# Patient Record
(demographics unavailable — no encounter records)

---

## 2025-02-24 NOTE — PLAN
[FreeTextEntry1] : - Obtain REEG (called to attempt to arrange same day appointment), also discussed aEEG if needed, and lab work including vitamin D and thyroid levels - Recommended psychiatrist and therapists to help with mood and stress management. Written psych, CBT, and biofeedback resources provided. - Maintain proper hydration and nutrition - Monitor for any additional fainting episodes/new symptoms - Compression stockings. - Referral to Cardiology for evaluation of possible cardiac causes of syncope - Provide seizure safety instructions to mother and patient - Will consider sleep study if nightly episodes continue after adjustments to new environment - Follow up in 2 months, or sooner if symptoms worsen.

## 2025-02-24 NOTE — CONSULT LETTER
[Dear  ___] : Dear  [unfilled], [Please see my note below.] : Please see my note below. [Sincerely,] : Sincerely, [FreeTextEntry3] : Melody Villela,  Attending Child Neurologist/Epileptologist

## 2025-02-24 NOTE — QUALITY MEASURES
[Seizure frequency] : Seizure frequency: Yes [Etiology, seizure type, and epilepsy syndrome] : Etiology, seizure type, and epilepsy syndrome: Yes [Side effects of anti-seizure medications] : Side effects of anti-seizure medications: Yes [Safety and education around seizures] : Safety and education around seizures: Yes [Sudden unexpected death in epilepsy (SUDEP)] : Sudden unexpected death in epilepsy: Yes [Screening for anxiety, depression] : Screening for anxiety, depression: Yes [25 Hydroxy Vitamin D level assessed and Vitamin D3 ordered] : 25 Hydroxy Vitamin D level assessed and Vitamin D3 ordered: Yes [Thyroid profile ordered] : Thyroid profile ordered: Yes [Issues around driving] : Issues around driving: Not Applicable [Treatment-resistant epilepsy (every visit)] : Treatment-resistant epilepsy (every visit): Not Applicable [Adherence to medication(s)] : Adherence to medication(s): Not Applicable [Counseling for women of childbearing potential with epilepsy (including folic acid supplement)] : Counseling for women of childbearing potential with epilepsy (including folic acid supplement): Not Applicable [Options for adjunctive therapy (Neurostimulation, CBD, Dietary Therapy, Epilepsy Surgery)] : Options for adjunctive therapy (Neurostimulation, CBD, Dietary Therapy, Epilepsy Surgery): Not Applicable

## 2025-02-24 NOTE — PHYSICAL EXAM
[Well-appearing] : well-appearing [Normocephalic] : normocephalic [No dysmorphic facial features] : no dysmorphic facial features [No ocular abnormalities] : no ocular abnormalities [Lungs clear] : lungs clear [Heart sounds regular in rate and rhythm] : heart sounds regular in rate and rhythm [Soft] : soft [No abnormal neurocutaneous stigmata or skin lesions] : no abnormal neurocutaneous stigmata or skin lesions [No deformities] : no deformities [Alert] : alert [Well related, good eye contact] : well related, good eye contact [Conversant] : conversant [Normal speech and language] : normal speech and language [Follows instructions well] : follows instructions well [Pupils reactive to light and accommodation] : pupils reactive to light and accommodation [Full extraocular movements] : full extraocular movements [Saccadic and smooth pursuits intact] : saccadic and smooth pursuits intact [No nystagmus] : no nystagmus [Normal facial sensation to light touch] : normal facial sensation to light touch [No facial asymmetry or weakness] : no facial asymmetry or weakness [Gross hearing intact] : gross hearing intact [Equal palate elevation] : equal palate elevation [Good shoulder shrug] : good shoulder shrug [Normal tongue movement] : normal tongue movement [Midline tongue, no fasciculations] : midline tongue, no fasciculations [L handed] : L handed [Normal axial and appendicular muscle tone] : normal axial and appendicular muscle tone [Gets up on table without difficulty] : gets up on table without difficulty [No pronator drift] : no pronator drift [Normal finger tapping and fine finger movements] : normal finger tapping and fine finger movements [No abnormal involuntary movements] : no abnormal involuntary movements [5/5 strength in proximal and distal muscles of arms and legs] : 5/5 strength in proximal and distal muscles of arms and legs [Walks and runs well] : walks and runs well [Able to do deep knee bend] : able to do deep knee bend [Able to walk on heels] : able to walk on heels [Able to walk on toes] : able to walk on toes [2+ biceps] : 2+ biceps [Triceps] : triceps [Knee jerks] : knee jerks [Ankle jerks] : ankle jerks [No ankle clonus] : no ankle clonus [Bilaterally] : bilaterally [No dysmetria on FTNT] : no dysmetria on FTNT [Good walking balance] : good walking balance [Normal gait] : normal gait [Able to tandem well] : able to tandem well [Negative Romberg] : negative Romberg [de-identified] : holding a plushie ("Onkie" - a pink pig) [de-identified] : Localized to LT and vibration

## 2025-02-24 NOTE — REVIEW OF SYSTEMS
[Fainting] : fainting [Seizure] : seizures [Anxiety] : anxiety [Normal] : Integumentary [Headache] : no headache [Dizziness] : no dizziness [Depression] : no depression

## 2025-02-24 NOTE — REASON FOR VISIT
[Initial Consultation] : an initial consultation for [Seizure] : seizure [Syncope] : syncope [Medical Records] : medical records [Patient] : patient [Mother] : mother

## 2025-02-24 NOTE — HISTORY OF PRESENT ILLNESS
[FreeTextEntry1] : Lucio is a 8 year old, left-handed (familial) male with dyslexia, ADHD, and speech and fine motor delays who presents to Neurology for their initial visit with concerns of syncope vs seizure. Accompanied by mother today.  Patient reports he was eating chocolate and opening [employer] cards at home when he took a bite of a chocolate kiss when he LOC and fell. Had urinary incontinence but denies tongue biting, foaming at the mouth, pallor, sweating, apnea, cyanosis, choking, post-ictal Juve's, convulsions. Per mother, she found him stiff on the floor with fist clenched eyes closed. Took a few seconds to regain consciousness and return back to baseline. Per mother he had intermittent consciousness (unconsciousness when sat up, regain consciousness when laid down) for total of 12 seconds. This was first time episode denies previous similar episodes. Occurred in the setting of flu. After the episode denies confusion, tiredness, HA, or myalgia. Denies autonomic (eye redness, tearing, sweating), vision changes (scotoma, photopsia, diplopia), positional changes, headaches waking up from sleep, weakness, speech, swallowing difficulties, paresthesias, vertigo, dizziness, lightheadedness, neck stiffness, any aura, triggers, emesis, nausea, photophobia, phonophobia, allergies.  Mother states patient usually sleeps around 8:30-9PM and starting in the past 2 years, has had 5-10 episodes, few minutes long (where patient is amnestic to event), occurring between 12-2 AM, he has episodes of rocking forth and backwards, sometimes with clenched hands, eye closed, sweaty, trance state, shakily, and urinary incontinence at times. She reports they moved from [place] 6/2024 and currently living with grandparents while house was being remodeled. Aunt is left-handed   Has Speech and fine motor delays; not on ADHD medications getting school counseling for anxiety from schoolwork through IEP Denies any previous meningitis, head trauma/concussion, neurosurgical procedure, autism, staring episodes, myoclonus, convulsions, waking up with blood on pillow, unexplained myalgias, febrile seizures, history of previous post-ictal Juve's, status epilepticus or seizure clusters, family history of seizures, autism, or developmental delays. Semiology of Events: stiffening Duration: <12 seconds Current Total Seizure Frequency: 1 lifetime event  Denies current fever, chill, URI symptoms, diarrhea, rash, sick contacts.   Birth History: full-term, denies NICU stays or complications, talking and fine motor was delayed but otherwise met developmental milestones, denies birthmarks Developmental History: has Speech therapy for articulation and drooling, has OT for fine motor skills, denies PT or NATALIIA therapy Past Medical History: dyslexia, ADHD, fine motor and speech delays Past Surgical History: denies Medications: were on albuterol and budesonide until 5 year old for croup and wheezing (ruled out asthma per mother) Allergies: denies Social History: Lives at home with parent and sister - healthy School: Currently in 3rd grade, doing well, has IEP for dyslexia and ADHD; loves video games; denies depression; anxious from schoolwork - school counseling   Family History: Denies family history of seizures, developmental delays, autism, headaches, or other neurological disorders.  Per chart review, seen in ED on 2/14/25 for syncope. 8-year-old male no past medical history presents ED brought in by EMS for syncopal episode at home. Unwitnessed, felt members heard a thud 1 hour prior to arrival patient was in dining area and members were in kitchen area heard a thud, for members walked over found patient lying on ground, neck extended arms clenched lasted for about 12 seconds, then had few seconds of confused speech still had another episode for 4 to 5 seconds. Subsequently returned to baseline.  No postictal phase.  Patient was diagnosed with flu on Tuesday, was given Tamiflu and cough medication discontinued on Wednesday.  Patient has had persistent cough and runny nose worsening last night.  Associated low-grade fever Tmax 101, taking Motrin.  Denies chest pain abdominal pain dysuria hematuria ear pain weakness fatigue. Hx provided by mom + dad, pt amnestic to events of fall. VUTD. BC with mild leukopenia without bandemia other 2 cell lines are normal.  Electrolytes are unremarkable and he is flu positive EKG is normal as is his chest x-ray.  There is no block on the EKG.  He remains very well-appearing smiling on exam and happily ambulating around the ED without symptoms.  Given urinary incontinence we will have him follow-up with neurology however we still favor syncope today over seizure given no postictal period nor family history.   Healthy, vaccinated 9yo here with ?syncope in setting of 4d low grade fever tm 100.9 w URI sx and Flu+ dx 3d PTA. +prodromal symptoms. No head trauma. No eye deviation, shaking movements, tongue bitinginate during episode; does have prior episodes of "nightmares" with urinary incontinence. Regained MS immediately after event. No family history of seizure.  Asymptomatic from cardiac standpoint incl no SOB/CP/palpitations and no family history of sudden cardiac death. Well-tete here with dry lips, VSS with benign cardiopulmonary and neurologic exam. Ap: No indication for head imaging at this time given fully back to baseline with atraumatic scalp. Given reassuring exam, likely vasovagal syncope in setting of viral syndrome, low susp for SBI/sepsis as well as cardiac syncope. Still favor syncope over Sz however for incontinence and prior episodes will f/u with neuro. For syncope and dehydration on exam in setting of flu

## 2025-02-24 NOTE — ASSESSMENT
[FreeTextEntry1] : Lucio is a 8 year old, left-handed (familial) male with dyslexia, ADHD, and speech and fine motor delays who presents to Neurology for their initial visit with concerns of first-lifetime syncope vs seizure. Patient reportedly had flu with a brief episode of LOC with urinary incontinence without typical prodromal symptoms but denies admit to worsening after being sat up and improvement upon laying down. He has episodes which sound consistent with night terrors in the setting of recent life stressors and anxiety. He is delayed in fine motor and speech with therapies at school, today has a non-focal neurological exam which is reassuring. Discussed Cardiologist evaluation, monitoring for any additional fainting episodes or new red flag symptoms. Additionally, the patient is advised to avoid known triggers, lie down at the first sign of symptoms to try prevent fainting, moderate exercise training, eating a higher salt diet, to help keep up blood volume, drinking plenty of fluids, to maintain blood volume, and wearing compression stockings. Obtain REEG (called to attempt to arrange same day appointment) and lab work including vitamin D and thyroid levels. Extensive counseling and written materials provided on seizure safety, SUDEP, prognosis, daily preventative anti-seizure medication for at least 1-2 years before repeating EEG, emergency abortive medication, ED/911 usage, IF epilepsy obtain MRI Brain with epilepsy protocol, Ophthalmology for dilated fundoscopic exam, and to consider [facility] genetic testing/referral to Genetics. Continue school therapies and recommended psychiatrist and therapists to help with mood and stress management. Written psych, CBT, and biofeedback resources provided. Follow up in 2 months, or sooner if symptoms worsen.  I spent a total of 75 minutes on the date of the encounter chart reviewing, evaluating, coordination of care, counseling, and treating the patient.